# Patient Record
Sex: MALE | Race: WHITE | NOT HISPANIC OR LATINO | URBAN - METROPOLITAN AREA
[De-identification: names, ages, dates, MRNs, and addresses within clinical notes are randomized per-mention and may not be internally consistent; named-entity substitution may affect disease eponyms.]

---

## 2024-03-14 ENCOUNTER — OV NP (OUTPATIENT)
Dept: URBAN - METROPOLITAN AREA CLINIC 111 | Facility: CLINIC | Age: 39
End: 2024-03-14
Payer: COMMERCIAL

## 2024-03-14 ENCOUNTER — LAB (OUTPATIENT)
Dept: URBAN - METROPOLITAN AREA CLINIC 111 | Facility: CLINIC | Age: 39
End: 2024-03-14

## 2024-03-14 VITALS
TEMPERATURE: 97.7 F | DIASTOLIC BLOOD PRESSURE: 91 MMHG | BODY MASS INDEX: 42.95 KG/M2 | HEIGHT: 70 IN | SYSTOLIC BLOOD PRESSURE: 159 MMHG | HEART RATE: 68 BPM | WEIGHT: 300 LBS

## 2024-03-14 DIAGNOSIS — R10.9 ABDOMINAL CRAMPING: ICD-10-CM

## 2024-03-14 DIAGNOSIS — R74.8 ELEVATED LIVER ENZYMES: ICD-10-CM

## 2024-03-14 DIAGNOSIS — K76.0 FATTY LIVER: ICD-10-CM

## 2024-03-14 DIAGNOSIS — R10.32 LEFT LOWER QUADRANT PAIN: ICD-10-CM

## 2024-03-14 PROCEDURE — 99244 OFF/OP CNSLTJ NEW/EST MOD 40: CPT | Performed by: STUDENT IN AN ORGANIZED HEALTH CARE EDUCATION/TRAINING PROGRAM

## 2024-03-14 RX ORDER — DICYCLOMINE HYDROCHLORIDE 10 MG/1
1 CAPSULE CAPSULE ORAL
Qty: 30 | Refills: 3 | OUTPATIENT
Start: 2024-03-14 | End: 2024-07-12

## 2024-03-14 RX ORDER — LOSARTAN POTASSIUM 100 MG/1
1 TABLET TABLET, FILM COATED ORAL ONCE A DAY
Status: ACTIVE | COMMUNITY

## 2024-03-14 NOTE — HPI-TODAY'S VISIT:
This patient was referred by Fanta Zepeda NP or evaluation of abdominal pain. A copy of this note will be sent to the referring provider. 39 yo M with PMHx HTN here for eval of LLQ pain. LLQ pain -- had for 1.5 years, but becoming more noticeable now. Worse with eating Also feels more movement on the left side Feels sharp for a second and then goes away.  Says at night if he lays on that side feels a pressure.  Reports having consipation but feels discomfort regardless of constipation. Had abdominal ultrasound 2 weeks ago, says spleen was enlarged and had fatty liver.  Labs reviewed on pts phone from 2/23/24 -- ALT in 60s, normal AST, Alk phos, and bilirubin. Remote h/o xarelto 5-6 years ago, which has since been discontinued -- says it caused liver failure

## 2024-03-28 PROBLEM — 197321007: Status: ACTIVE | Noted: 2024-03-28
